# Patient Record
Sex: MALE | Race: WHITE | NOT HISPANIC OR LATINO | Employment: FULL TIME | URBAN - METROPOLITAN AREA
[De-identification: names, ages, dates, MRNs, and addresses within clinical notes are randomized per-mention and may not be internally consistent; named-entity substitution may affect disease eponyms.]

---

## 2017-01-01 ENCOUNTER — APPOINTMENT (OUTPATIENT)
Dept: RADIOLOGY | Facility: MEDICAL CENTER | Age: 52
DRG: 686 | End: 2017-01-01
Attending: INTERNAL MEDICINE
Payer: COMMERCIAL

## 2017-01-01 ENCOUNTER — PATIENT OUTREACH (OUTPATIENT)
Dept: HEALTH INFORMATION MANAGEMENT | Facility: OTHER | Age: 52
End: 2017-01-01

## 2017-01-01 PROCEDURE — 71010 DX-CHEST-LIMITED (1 VIEW): CPT

## 2017-01-03 ENCOUNTER — TELEPHONE (OUTPATIENT)
Dept: OTHER | Facility: MEDICAL CENTER | Age: 52
End: 2017-01-03

## 2017-01-04 ENCOUNTER — TELEPHONE (OUTPATIENT)
Dept: OTHER | Facility: MEDICAL CENTER | Age: 52
End: 2017-01-04

## 2017-01-04 NOTE — PROGRESS NOTES
Patient's wife called nurse navigation line.  Returned her call.  Wife is concerned that the patient was discharged with his previous home oxygen tanks. Patient is now on 5L continuous and the tanks he has will only last 45 minutes at that rate.  She also stated she was told by Fernando in [place] that the concentrator he currently has is not sufficient to run at 5L continuously and may stop working suddenly.  No new orders were received by Fernando.  Call to [facility] left a message that the patient needs new rx for oxygen along with a conserving device.  Patient is in a lot of pain.  Was discharged on tramadol 50mg one tab every 4 hours but it is not working very well.  Advised to call physician office to enquire if the dose can be increased.  Patient will see his primary care physician in [place] tomorrow and requested records from this past admit be sent to Dr. Wilder so he is aware of the patient's pain and oxygen needs.  Release of Records emailed to patient to sign and email back.  Another concern is that the patient was under the impression he was to have surgery this week to remove the kidney.  Patient is aware Dr. Barros is out of town this week and wants anyone in the group to do the surgery as soon as possible.  Call to Dr. Barros's office.  Spoke with Regla in scheduling. She states she has emailed Dr. Barros and will speak to Dr. Enriquez first thing in the morning to see if the surgery can be done soon.  Regla to call patient in the morning as soon as she knows something.  Encouraged to call 911 if or go to ER in [place] if pain becomes intolerable or is about to run out of oxygen.

## 2017-01-05 NOTE — PROGRESS NOTES
Telephone call from patient's wife.  [facility] local PCP in [place] ordered oxygen for the patient and Fernando was there delivering the oxygen as we were on the telephone.  States Antonieta' pain is increasing and she wants him to have surgery as soon as possible and does not want to have to wait for Dr. Barros to return from vacation.  At this time the surgery has been scheduled for January 13th.  The local physician is helping with the pain control and gave him an injection of Toradol.  Encouraged to call with needs or questions.